# Patient Record
Sex: MALE | Race: WHITE | NOT HISPANIC OR LATINO | Employment: STUDENT | URBAN - METROPOLITAN AREA
[De-identification: names, ages, dates, MRNs, and addresses within clinical notes are randomized per-mention and may not be internally consistent; named-entity substitution may affect disease eponyms.]

---

## 2021-01-15 ENCOUNTER — HOSPITAL ENCOUNTER (OUTPATIENT)
Facility: CLINIC | Age: 21
Discharge: HOME | End: 2021-01-15
Attending: FAMILY MEDICINE
Payer: COMMERCIAL

## 2021-01-15 VITALS
OXYGEN SATURATION: 99 % | HEART RATE: 75 BPM | SYSTOLIC BLOOD PRESSURE: 120 MMHG | TEMPERATURE: 98.4 F | DIASTOLIC BLOOD PRESSURE: 78 MMHG | RESPIRATION RATE: 18 BRPM | WEIGHT: 186.8 LBS

## 2021-01-15 DIAGNOSIS — L73.9 FOLLICULITIS: Primary | ICD-10-CM

## 2021-01-15 PROCEDURE — S9083 URGENT CARE CENTER GLOBAL: HCPCS | Performed by: FAMILY MEDICINE

## 2021-01-15 PROCEDURE — 99202 OFFICE O/P NEW SF 15 MIN: CPT | Performed by: FAMILY MEDICINE

## 2021-01-15 RX ORDER — SULFAMETHOXAZOLE AND TRIMETHOPRIM 800; 160 MG/1; MG/1
1 TABLET ORAL 2 TIMES DAILY
Qty: 14 TABLET | Refills: 0 | Status: SHIPPED | OUTPATIENT
Start: 2021-01-15 | End: 2021-01-22

## 2021-01-15 ASSESSMENT — ENCOUNTER SYMPTOMS
COLOR CHANGE: 0
ARTHRALGIAS: 0
BACK PAIN: 0
PALPITATIONS: 0
CHILLS: 0
HEMATURIA: 0
SORE THROAT: 0
DYSURIA: 0
SHORTNESS OF BREATH: 0
FEVER: 0
EYE PAIN: 0
VOMITING: 0
SEIZURES: 0
COUGH: 0
ABDOMINAL PAIN: 0

## 2021-01-15 NOTE — ED PROVIDER NOTES
"HPI     Chief Complaint   Patient presents with   • possible hernia     possible hernia in groin area  x3 days       Pt presents for a 3 day hx of \"bulge\" in R groin.  He states that was lifting heavy paint buckets on Monday, 5 days PTA.  Pt states it is mildly painful.  Per pt, he wiped some questionable purulence off of it this AM, which he believes was green.  He has shaved the area recently but denies any other trauma.      Denies systemic sxs such as abd pain, fevers, chills, n/v, constipation, diarrhea.               Patient History     History reviewed. No pertinent past medical history.    History reviewed. No pertinent surgical history.    History reviewed. No pertinent family history.    Social History     Tobacco Use   • Smoking status: Never Smoker   • Smokeless tobacco: Current User   Substance Use Topics   • Alcohol use: Not on file   • Drug use: Not on file       Systems Reviewed from Nursing Triage:          Review of Systems     Review of Systems   Constitutional: Negative for chills and fever.   HENT: Negative for ear pain and sore throat.    Eyes: Negative for pain and visual disturbance.   Respiratory: Negative for cough and shortness of breath.    Cardiovascular: Negative for chest pain and palpitations.   Gastrointestinal: Negative for abdominal pain and vomiting.   Genitourinary: Negative for dysuria and hematuria.   Musculoskeletal: Negative for arthralgias and back pain.   Skin: Negative for color change and rash.        \"bump\" on groin   Neurological: Negative for seizures and syncope.   All other systems reviewed and are negative.       Physical Exam     ED Vitals    Date/Time Temp Pulse Resp BP Sp[facility]   01/15/21 1502 36.9 °C (98.4 °F) 75 18 120/78 99 % [facility]                                                           Physical Exam  Vitals signs and nursing note reviewed.   Constitutional:       Appearance: He is well-developed.   HENT:      Head: Normocephalic and atraumatic.   Eyes:      " Conjunctiva/sclera: Conjunctivae normal.   Neck:      Musculoskeletal: Neck supple.   Cardiovascular:      Rate and Rhythm: Normal rate and regular rhythm.      Heart sounds: No murmur.   Pulmonary:      Effort: Pulmonary effort is normal. No respiratory distress.      Breath sounds: Normal breath sounds.   Abdominal:      Palpations: Abdomen is soft.      Tenderness: There is no abdominal tenderness.      Hernia: There is no hernia in the left inguinal area or right inguinal area.   Genitourinary:     Penis: Normal.       Scrotum/Testes: Normal.       Skin:     General: Skin is warm and dry.   Neurological:      Mental Status: He is alert.              Procedures    Results     None              No orders to display               ED Course & MDM     MDM  Number of Diagnoses or Management Options  Folliculitis:   Diagnosis management comments: 19yo/M p/w cellulitis 2/2 shaving pubic hair.  Expressed in room with good reduction of size of cyst.  Counseled pt to use warm compresses and continue to expres material from folliculitis.  Additionally will provide script for course of Bactrim DS x 7 days..  Pt to follow up w/ [facility] if sxs persist.           Clinical Impressions as of Piotr 15 1529   Folliculitis        Dilip Samuel,   Resident  01/15/21 1529

## 2021-01-15 NOTE — DISCHARGE INSTRUCTIONS
Your bump is a collection of pus and inflammatory debris from an infection of the overlying hair follicle, called a folliculitis.  Please take the course of antibiotics that was sent to your pharmacy as prescribed.  Please use warm compresses on the area and continue to remove as much pus from the area as possible.  If your symptoms continue even after you finished your antibiotics, please see your [facility].

## 2021-07-14 ENCOUNTER — WALK IN (OUTPATIENT)
Dept: URGENT CARE | Age: 21
End: 2021-07-14

## 2021-07-14 VITALS
RESPIRATION RATE: 16 BRPM | HEART RATE: 50 BPM | SYSTOLIC BLOOD PRESSURE: 121 MMHG | OXYGEN SATURATION: 100 % | WEIGHT: 180 LBS | DIASTOLIC BLOOD PRESSURE: 69 MMHG | BODY MASS INDEX: 23.86 KG/M2 | TEMPERATURE: 96.7 F | HEIGHT: 73 IN

## 2021-07-14 DIAGNOSIS — R10.32 LEFT LOWER QUADRANT ABDOMINAL PAIN: Primary | ICD-10-CM

## 2021-07-14 PROCEDURE — 99204 OFFICE O/P NEW MOD 45 MIN: CPT | Performed by: FAMILY MEDICINE

## 2021-07-14 ASSESSMENT — PAIN SCALES - GENERAL: PAINLEVEL: 4
